# Patient Record
Sex: FEMALE | Race: WHITE | NOT HISPANIC OR LATINO | Employment: UNEMPLOYED | URBAN - METROPOLITAN AREA
[De-identification: names, ages, dates, MRNs, and addresses within clinical notes are randomized per-mention and may not be internally consistent; named-entity substitution may affect disease eponyms.]

---

## 2020-10-28 ENCOUNTER — HOSPITAL ENCOUNTER (EMERGENCY)
Facility: HOSPITAL | Age: 60
Discharge: HOME | End: 2020-10-28
Attending: EMERGENCY MEDICINE

## 2020-10-28 VITALS
OXYGEN SATURATION: 98 % | RESPIRATION RATE: 16 BRPM | DIASTOLIC BLOOD PRESSURE: 78 MMHG | HEIGHT: 63 IN | HEART RATE: 82 BPM | BODY MASS INDEX: 29.77 KG/M2 | WEIGHT: 168 LBS | SYSTOLIC BLOOD PRESSURE: 126 MMHG | TEMPERATURE: 98 F

## 2020-10-28 DIAGNOSIS — R07.9 CHEST PAIN: ICD-10-CM

## 2020-10-28 DIAGNOSIS — R07.89 CHEST WALL PAIN: ICD-10-CM

## 2020-10-28 DIAGNOSIS — S20.219A CONTUSION OF CHEST WALL, UNSPECIFIED LATERALITY, INITIAL ENCOUNTER: Primary | ICD-10-CM

## 2020-10-28 DIAGNOSIS — R07.81 RIB PAIN: ICD-10-CM

## 2020-10-28 PROCEDURE — 25000003 PHARM REV CODE 250: Mod: ER | Performed by: PHYSICIAN ASSISTANT

## 2020-10-28 PROCEDURE — 99284 EMERGENCY DEPT VISIT MOD MDM: CPT | Mod: 25,ER

## 2020-10-28 RX ORDER — CYCLOBENZAPRINE HCL 10 MG
10 TABLET ORAL
Status: COMPLETED | OUTPATIENT
Start: 2020-10-28 | End: 2020-10-28

## 2020-10-28 RX ORDER — OXYCODONE AND ACETAMINOPHEN 5; 325 MG/1; MG/1
1 TABLET ORAL EVERY 4 HOURS PRN
Qty: 15 TABLET | Refills: 0 | Status: SHIPPED | OUTPATIENT
Start: 2020-10-28 | End: 2020-10-31

## 2020-10-28 RX ORDER — CYCLOBENZAPRINE HCL 10 MG
10 TABLET ORAL 3 TIMES DAILY PRN
Qty: 20 TABLET | Refills: 0 | Status: SHIPPED | OUTPATIENT
Start: 2020-10-28 | End: 2020-11-04

## 2020-10-28 RX ORDER — IBUPROFEN 600 MG/1
600 TABLET ORAL EVERY 6 HOURS PRN
Qty: 20 TABLET | Refills: 0 | Status: SHIPPED | OUTPATIENT
Start: 2020-10-28 | End: 2020-11-02

## 2020-10-28 RX ORDER — OXYCODONE AND ACETAMINOPHEN 5; 325 MG/1; MG/1
1 TABLET ORAL
Status: COMPLETED | OUTPATIENT
Start: 2020-10-28 | End: 2020-10-28

## 2020-10-28 RX ORDER — LIDOCAINE 50 MG/G
1 PATCH TOPICAL DAILY
Qty: 15 PATCH | Refills: 0 | Status: SHIPPED | OUTPATIENT
Start: 2020-10-28 | End: 2020-11-12

## 2020-10-28 RX ADMIN — CYCLOBENZAPRINE 10 MG: 10 TABLET, FILM COATED ORAL at 03:10

## 2020-10-28 RX ADMIN — OXYCODONE HYDROCHLORIDE AND ACETAMINOPHEN 1 TABLET: 5; 325 TABLET ORAL at 03:10

## 2020-10-28 NOTE — ED PROVIDER NOTES
Encounter Date: 10/28/2020       History     Chief Complaint   Patient presents with    Fall     PT REPORTS TRIP AND FALL 2 DAYS AGO, LANDING ON CHEST, REPORTS PAIN WORSENING WITH C/O PAIN TO CHES AND RIBS WITH SOB ON INSPIRATION     Amos Houser is a 59 y.o. female who presents to the ED for evaluation of constant and worsening mid-sternal chest pain s/p tripping and falling forward onto the concrete curb and landing on her chest 3 days ago. Patient denies head injury or LOC. Patient reports the pain radiates to her ribs and flank today. Endorses SOB on inspiration. Attempted treatment with 600 mg Tylenol.  Reports no past similar symptoms. Denies fever, chills, cough, rhinorrhea, dizziness, lightheadedness, headache, visual disturbances, or abdominal pain. Patient has no other underlying medical concerns.    The history is provided by the patient and a relative. No  was used.     Review of patient's allergies indicates:  No Known Allergies  History reviewed. No pertinent past medical history.  Past Surgical History:   Procedure Laterality Date    SINUS SURGERY       History reviewed. No pertinent family history.  Social History     Tobacco Use    Smoking status: Never Smoker    Smokeless tobacco: Never Used   Substance Use Topics    Alcohol use: Never     Frequency: Never    Drug use: Never     Review of Systems   Constitutional: Negative for chills and fever.   HENT: Negative for rhinorrhea.    Eyes: Negative for visual disturbance.   Respiratory: Positive for shortness of breath. Negative for cough.    Cardiovascular: Positive for chest pain (and rib pain).   Gastrointestinal: Negative for abdominal pain.   Genitourinary: Positive for flank pain.   Neurological: Negative for dizziness, syncope, light-headedness and headaches.   All other systems reviewed and are negative.      Physical Exam     Initial Vitals [10/28/20 1506]   BP Pulse Resp Temp SpO2   115/81 78 20 97.9 °F (36.6 °C) 100 %       MAP       --         Physical Exam    Nursing note and vitals reviewed.  Constitutional: She appears well-developed.   HENT:   Head: Normocephalic.   Right Ear: External ear normal.   Left Ear: External ear normal.   Nose: Nose normal.   Mouth/Throat: Oropharynx is clear and moist.   Eyes: Conjunctivae and EOM are normal. Pupils are equal, round, and reactive to light.   Neck: Normal range of motion. Neck supple.   Cardiovascular: Normal rate, regular rhythm, S1 normal, S2 normal and normal heart sounds. Exam reveals no gallop and no friction rub.    No murmur heard.  Pulmonary/Chest: Breath sounds normal. No respiratory distress. She has no wheezes. She has no rhonchi. She has no rales.   TTP over the sternum and the anterolateral ribs bilaterally.     Abdominal: Soft. There is no abdominal tenderness.   Musculoskeletal: Normal range of motion.      Comments: No midline tenderness of the spine.   Neurological: She is alert and oriented to person, place, and time.   Skin: Skin is warm and dry. Capillary refill takes less than 2 seconds.   Psychiatric: She has a normal mood and affect. Her behavior is normal.         ED Course   Procedures  Labs Reviewed - No data to display       Imaging Results          X-Ray Chest PA And Lateral (Final result)  Result time 10/28/20 16:00:08    Final result by Jeniffer Hurtado MD (10/28/20 16:00:08)                 Impression:      Normal exam.      Electronically signed by: Jeniffer Hurtado MD  Date:    10/28/2020  Time:    16:00             Narrative:    EXAMINATION:  XR CHEST PA AND LATERAL    CLINICAL HISTORY:  Chest pain, unspecified    TECHNIQUE:  PA and lateral views of the chest were performed.    COMPARISON:  None    FINDINGS:  The lungs are symmetrically inflated with no mass, nodule, pneumothorax, airspace consolidation or pleural effusion.    The cardiomediastinal silhouette is normal.    The osseous and soft tissue structures are normal.                                X-Ray Ribs 3 Views Bilateral (Final result)  Result time 10/28/20 16:02:58    Final result by Jeniffer Hurtado MD (10/28/20 16:02:58)                 Impression:      Normal exam.      Electronically signed by: Jeniffer Hurtado MD  Date:    10/28/2020  Time:    16:02             Narrative:    EXAMINATION:  XR RIBS 3 VIEWS BILATERAL    CLINICAL HISTORY:  Pleurodynia    TECHNIQUE:  PA and oblique radiographs of the bilateral ribs were submitted.    COMPARISON:  None    FINDINGS:  Evaluation of the ribs bilaterally shows no fracture or osseous destructive process.  The lung parenchyma is normal.  Senescent changes of the thoracic spine are present.  The cardiomediastinal silhouette is normal.                                 Medical Decision Making:   Initial Assessment:   58 y/o F presenting for evaluation of chest pain and rib pain. Exam above. X-rays negative for rib fracture or sternum fracture. Not hypoxic or tachycardic. No abdominal tenderness or significant bruising. Feeling better after flexeril and percocet. Injury occurred 3 days ago. No head injury, HA, neck pain,  Back pain, weakness, paresthesias, confusion. No abdominal pain. Considered but doubt acute intracranial process, intraabdominal organ injury or bleed. PCp follow up in 2 days. Returnt o ER for worsening symptoms maricruz s needed. Discussed with DR. Slaughter who agrees with assessment and paln.   Independently Interpreted Test(s):   I have ordered and independently interpreted X-rays - see prior notes.  Clinical Tests:   Radiological Study: Ordered and Reviewed            Scribe Attestation:   Scribe #1: I performed the above scribed service and the documentation accurately describes the services I performed. I attest to the accuracy of the note.    Attending Attestation:   Physician Attestation Statement for Resident:  As the supervising MD  I agree with the above history. -:   As the supervising MD I agree with the above PE.    As the  supervising MD I agree with the above treatment, course, plan, and disposition.   -: I have staffed the patient with the midlevel provider and was available for consultation in the department. I have guided the treatment plan and agree with the care provided.            Physician Attestation for Scribe:  Physician Attestation Statement for Scribe #1: I, Concepcion Islas PA-C, reviewed documentation, as scribed by Cara Costello in my presence, and it is both accurate and complete.                            Clinical Impression:       ICD-10-CM ICD-9-CM   1. Contusion of chest wall, unspecified laterality, initial encounter  S20.219A 922.1   2. Chest pain  R07.9 786.50   3. Chest wall pain  R07.89 786.52   4. Rib pain  R07.81 786.50                          ED Disposition Condition    Discharge Stable        ED Prescriptions     Medication Sig Dispense Start Date End Date Auth. Provider    oxyCODONE-acetaminophen (PERCOCET) 5-325 mg per tablet Take 1 tablet by mouth every 4 (four) hours as needed for Pain. MAY CAUSE DROWSINESS 15 tablet 10/28/2020 10/31/2020 Concepcion Islas PA-C    cyclobenzaprine (FLEXERIL) 10 MG tablet Take 1 tablet (10 mg total) by mouth 3 (three) times daily as needed for Muscle spasms. 20 tablet 10/28/2020 11/4/2020 Concepcion Islas PA-C    ibuprofen (ADVIL,MOTRIN) 600 MG tablet Take 1 tablet (600 mg total) by mouth every 6 (six) hours as needed for Pain. 20 tablet 10/28/2020 11/2/2020 Concepcion Islas PA-C    lidocaine (LIDODERM) 5 % Place 1 patch onto the skin once daily. Remove & Discard patch within 12 hours or as directed by MD. May use 4% over the counter if not covered by insurance for 15 days 15 patch 10/28/2020 11/12/2020 Concepcion Islas PA-C        Follow-up Information     Follow up With Specialties Details Why Contact Info    [facility] Emergency Department    [address] [facility] [address]  [phone]      [facility]  Clinic  Schedule an appointment as soon as possible for a visit   [address] [facility] [address]  [phone]      [facility] Emergency Department Emergency Medicine Go to  As needed, If symptoms worsen [facility] [id number] [phone]                                       Concepcion Islas PA-C  10/28/20 1726       Shakila Slaughter MD  10/28/20 3526

## 2020-10-28 NOTE — DISCHARGE INSTRUCTIONS
Take medication as prescribed. Follow up with primary care in 2 days. Return to ER for worsening symptoms, worsening shortness of breath or chest pain or as needed

## 2020-10-28 NOTE — ED TRIAGE NOTES
Pt presents to ER with c/o sternal pain that radiates to bilateral upper axillary rib pain. Pt reports a mechanical fall on Sunday.

## 2021-12-31 ENCOUNTER — LAB VISIT (OUTPATIENT)
Dept: PRIMARY CARE CLINIC | Facility: OTHER | Age: 61
End: 2021-12-31
Attending: INTERNAL MEDICINE
Payer: OTHER GOVERNMENT

## 2021-12-31 DIAGNOSIS — Z20.822 ENCOUNTER FOR LABORATORY TESTING FOR COVID-19 VIRUS: ICD-10-CM

## 2021-12-31 PROCEDURE — U0003 INFECTIOUS AGENT DETECTION BY NUCLEIC ACID (DNA OR RNA); SEVERE ACUTE RESPIRATORY SYNDROME CORONAVIRUS 2 (SARS-COV-2) (CORONAVIRUS DISEASE [COVID-19]), AMPLIFIED PROBE TECHNIQUE, MAKING USE OF HIGH THROUGHPUT TECHNOLOGIES AS DESCRIBED BY CMS-2020-01-R: HCPCS | Mod: ST72 | Performed by: INTERNAL MEDICINE

## 2022-01-04 LAB
SARS-COV-2 RNA RESP QL NAA+PROBE: NOT DETECTED
SARS-COV-2- CYCLE NUMBER: NORMAL